# Patient Record
Sex: FEMALE | Race: WHITE | NOT HISPANIC OR LATINO | Employment: STUDENT | URBAN - METROPOLITAN AREA
[De-identification: names, ages, dates, MRNs, and addresses within clinical notes are randomized per-mention and may not be internally consistent; named-entity substitution may affect disease eponyms.]

---
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
## 2024-07-29 NOTE — TELEPHONE ENCOUNTER
----- Message from Laura Salinas MA sent at 8/7/2017 11:30 AM CDT -----  Contact: Rayna / mother  Erwin Stephens  MRN: [id number]  Home Phone      [phone]  Work Phone      Not on file.  Mobile          [phone]    Patient Care Team:  Tee Echavarria MD as PCP - General (Internal Medicine)  OB? No  What phone number can you be reached at?   [phone]  Message:   Needs to discuss depo injections.      
[place] desire refill of Depo Provera. Annual exam scheduled with Dr. Morocho.   There is no problem list on file for this patient.    Prior to Admission medications    Medication Sig Start Date End Date Taking? Authorizing Provider   medroxyPROGESTERone (DEPO-PROVERA) 150 mg/mL injection Inject 1 mL (150 mg total) into the muscle every 3 (three) months. 11/14/16 11/14/17  Adi Desir MD       
1. Continue to monitor labs, weights, BM, skin, edema and clinical course. 2. Follow pt as per protocol.

## 2025-03-04 NOTE — PROGRESS NOTES
Medroxyprogesterone 150 mg / 1ml injection given in the right hip at 11:47 am on 05/07/2018 LOT [id number] EXP 11/2019 negative UPT 11:26 am 05/07/2018 next window is 07/23/2018 to 08/06/2018 Panfilo Rai PharmD.    Wt Readings from Last 3 Encounters:   03/03/25 81.3 kg (179 lb 3.7 oz)   01/31/25 79.8 kg (175 lb 14.8 oz)   01/29/25 79.8 kg (175 lb 14.8 oz)     Last echo was from 2022 LVEF 60%, grade 1 diastolic dysfunction status post TAVR no evidence of volume overload  Weight appears stable at baseline  I/O, daily weight  Resume diuretic currently resume daily patient takes every other day  Can resume home dose upon discharge